# Patient Record
Sex: FEMALE | Race: ASIAN | NOT HISPANIC OR LATINO | URBAN - METROPOLITAN AREA
[De-identification: names, ages, dates, MRNs, and addresses within clinical notes are randomized per-mention and may not be internally consistent; named-entity substitution may affect disease eponyms.]

---

## 2018-11-16 ENCOUNTER — OUTPATIENT (OUTPATIENT)
Dept: OUTPATIENT SERVICES | Facility: HOSPITAL | Age: 38
LOS: 1 days | End: 2018-11-16
Payer: COMMERCIAL

## 2018-11-16 DIAGNOSIS — Z01.818 ENCOUNTER FOR OTHER PREPROCEDURAL EXAMINATION: ICD-10-CM

## 2018-11-16 LAB
BLD GP AB SCN SERPL QL: NEGATIVE — SIGNIFICANT CHANGE UP
HCT VFR BLD CALC: 36.6 % — SIGNIFICANT CHANGE UP (ref 34.5–45)
HGB BLD-MCNC: 12.3 G/DL — SIGNIFICANT CHANGE UP (ref 11.5–15.5)
MCHC RBC-ENTMCNC: 30.6 PG — SIGNIFICANT CHANGE UP (ref 27–34)
MCHC RBC-ENTMCNC: 33.6 G/DL — SIGNIFICANT CHANGE UP (ref 32–36)
MCV RBC AUTO: 91 FL — SIGNIFICANT CHANGE UP (ref 80–100)
PLATELET # BLD AUTO: 152 K/UL — SIGNIFICANT CHANGE UP (ref 150–400)
RBC # BLD: 4.02 M/UL — SIGNIFICANT CHANGE UP (ref 3.8–5.2)
RBC # FLD: 14.2 % — SIGNIFICANT CHANGE UP (ref 10.3–16.9)
RH IG SCN BLD-IMP: POSITIVE — SIGNIFICANT CHANGE UP
T PALLIDUM AB TITR SER: NEGATIVE — SIGNIFICANT CHANGE UP
WBC # BLD: 6.3 K/UL — SIGNIFICANT CHANGE UP (ref 3.8–10.5)
WBC # FLD AUTO: 6.3 K/UL — SIGNIFICANT CHANGE UP (ref 3.8–10.5)

## 2018-11-16 PROCEDURE — 86901 BLOOD TYPING SEROLOGIC RH(D): CPT

## 2018-11-16 PROCEDURE — 86780 TREPONEMA PALLIDUM: CPT

## 2018-11-16 PROCEDURE — 86850 RBC ANTIBODY SCREEN: CPT

## 2018-11-16 PROCEDURE — 86900 BLOOD TYPING SEROLOGIC ABO: CPT

## 2018-11-16 PROCEDURE — 85027 COMPLETE CBC AUTOMATED: CPT

## 2018-11-19 ENCOUNTER — RESULT REVIEW (OUTPATIENT)
Age: 38
End: 2018-11-19

## 2018-11-19 ENCOUNTER — INPATIENT (INPATIENT)
Facility: HOSPITAL | Age: 38
LOS: 1 days | Discharge: HOME | End: 2018-11-21
Attending: OBSTETRICS & GYNECOLOGY | Admitting: OBSTETRICS & GYNECOLOGY
Payer: COMMERCIAL

## 2018-11-19 ENCOUNTER — TRANSCRIPTION ENCOUNTER (OUTPATIENT)
Age: 38
End: 2018-11-19

## 2018-11-19 VITALS — HEIGHT: 62 IN | WEIGHT: 127.21 LBS

## 2018-11-19 LAB
ALBUMIN SERPL ELPH-MCNC: 3.7 G/DL — SIGNIFICANT CHANGE UP (ref 3.3–5)
ALP SERPL-CCNC: 69 U/L — SIGNIFICANT CHANGE UP (ref 40–120)
ALT FLD-CCNC: 8 U/L — LOW (ref 10–45)
ANION GAP SERPL CALC-SCNC: 16 MMOL/L — SIGNIFICANT CHANGE UP (ref 5–17)
AST SERPL-CCNC: 15 U/L — SIGNIFICANT CHANGE UP (ref 10–40)
BASOPHILS NFR BLD AUTO: 0.3 % — SIGNIFICANT CHANGE UP (ref 0–2)
BILIRUB SERPL-MCNC: 0.3 MG/DL — SIGNIFICANT CHANGE UP (ref 0.2–1.2)
BLD GP AB SCN SERPL QL: NEGATIVE — SIGNIFICANT CHANGE UP
BUN SERPL-MCNC: 9 MG/DL — SIGNIFICANT CHANGE UP (ref 7–23)
CALCIUM SERPL-MCNC: 8.8 MG/DL — SIGNIFICANT CHANGE UP (ref 8.4–10.5)
CHLORIDE SERPL-SCNC: 98 MMOL/L — SIGNIFICANT CHANGE UP (ref 96–108)
CO2 SERPL-SCNC: 20 MMOL/L — LOW (ref 22–31)
CREAT SERPL-MCNC: 0.49 MG/DL — LOW (ref 0.5–1.3)
EOSINOPHIL NFR BLD AUTO: 0.8 % — SIGNIFICANT CHANGE UP (ref 0–6)
GLUCOSE SERPL-MCNC: 85 MG/DL — SIGNIFICANT CHANGE UP (ref 70–99)
HCT VFR BLD CALC: 35.2 % — SIGNIFICANT CHANGE UP (ref 34.5–45)
HGB BLD-MCNC: 11.7 G/DL — SIGNIFICANT CHANGE UP (ref 11.5–15.5)
LYMPHOCYTES # BLD AUTO: 26 % — SIGNIFICANT CHANGE UP (ref 13–44)
MCHC RBC-ENTMCNC: 30 PG — SIGNIFICANT CHANGE UP (ref 27–34)
MCHC RBC-ENTMCNC: 33.2 G/DL — SIGNIFICANT CHANGE UP (ref 32–36)
MCV RBC AUTO: 90.3 FL — SIGNIFICANT CHANGE UP (ref 80–100)
MONOCYTES NFR BLD AUTO: 9.2 % — SIGNIFICANT CHANGE UP (ref 2–14)
NEUTROPHILS NFR BLD AUTO: 63.7 % — SIGNIFICANT CHANGE UP (ref 43–77)
NT-PROBNP SERPL-SCNC: 22 PG/ML — SIGNIFICANT CHANGE UP (ref 0–300)
PLATELET # BLD AUTO: 150 K/UL — SIGNIFICANT CHANGE UP (ref 150–400)
POTASSIUM SERPL-MCNC: 3.9 MMOL/L — SIGNIFICANT CHANGE UP (ref 3.5–5.3)
POTASSIUM SERPL-SCNC: 3.9 MMOL/L — SIGNIFICANT CHANGE UP (ref 3.5–5.3)
PROT SERPL-MCNC: 6.9 G/DL — SIGNIFICANT CHANGE UP (ref 6–8.3)
RBC # BLD: 3.9 M/UL — SIGNIFICANT CHANGE UP (ref 3.8–5.2)
RBC # FLD: 14.3 % — SIGNIFICANT CHANGE UP (ref 10.3–16.9)
RH IG SCN BLD-IMP: POSITIVE — SIGNIFICANT CHANGE UP
SODIUM SERPL-SCNC: 134 MMOL/L — LOW (ref 135–145)
T PALLIDUM AB TITR SER: NEGATIVE — SIGNIFICANT CHANGE UP
WBC # BLD: 6.4 K/UL — SIGNIFICANT CHANGE UP (ref 3.8–10.5)
WBC # FLD AUTO: 6.4 K/UL — SIGNIFICANT CHANGE UP (ref 3.8–10.5)

## 2018-11-19 RX ORDER — CITRIC ACID/SODIUM CITRATE 300-500 MG
15 SOLUTION, ORAL ORAL EVERY 4 HOURS
Qty: 0 | Refills: 0 | Status: DISCONTINUED | OUTPATIENT
Start: 2018-11-19 | End: 2018-11-19

## 2018-11-19 RX ORDER — GLYCERIN ADULT
1 SUPPOSITORY, RECTAL RECTAL AT BEDTIME
Qty: 0 | Refills: 0 | Status: DISCONTINUED | OUTPATIENT
Start: 2018-11-19 | End: 2018-11-21

## 2018-11-19 RX ORDER — CEFAZOLIN SODIUM 1 G
2000 VIAL (EA) INJECTION ONCE
Qty: 0 | Refills: 0 | Status: COMPLETED | OUTPATIENT
Start: 2018-11-19 | End: 2018-11-19

## 2018-11-19 RX ORDER — OXYCODONE AND ACETAMINOPHEN 5; 325 MG/1; MG/1
1 TABLET ORAL
Qty: 0 | Refills: 0 | Status: DISCONTINUED | OUTPATIENT
Start: 2018-11-19 | End: 2018-11-21

## 2018-11-19 RX ORDER — OXYTOCIN 10 UNIT/ML
333.33 VIAL (ML) INJECTION
Qty: 20 | Refills: 0 | Status: DISCONTINUED | OUTPATIENT
Start: 2018-11-19 | End: 2018-11-21

## 2018-11-19 RX ORDER — OXYTOCIN 10 UNIT/ML
41.67 VIAL (ML) INJECTION
Qty: 20 | Refills: 0 | Status: DISCONTINUED | OUTPATIENT
Start: 2018-11-19 | End: 2018-11-19

## 2018-11-19 RX ORDER — FERROUS SULFATE 325(65) MG
325 TABLET ORAL DAILY
Qty: 0 | Refills: 0 | Status: DISCONTINUED | OUTPATIENT
Start: 2018-11-19 | End: 2018-11-21

## 2018-11-19 RX ORDER — OXYCODONE AND ACETAMINOPHEN 5; 325 MG/1; MG/1
2 TABLET ORAL EVERY 6 HOURS
Qty: 0 | Refills: 0 | Status: DISCONTINUED | OUTPATIENT
Start: 2018-11-19 | End: 2018-11-21

## 2018-11-19 RX ORDER — LANOLIN
1 OINTMENT (GRAM) TOPICAL
Qty: 0 | Refills: 0 | Status: DISCONTINUED | OUTPATIENT
Start: 2018-11-19 | End: 2018-11-21

## 2018-11-19 RX ORDER — DIPHENHYDRAMINE HCL 50 MG
25 CAPSULE ORAL EVERY 6 HOURS
Qty: 0 | Refills: 0 | Status: DISCONTINUED | OUTPATIENT
Start: 2018-11-19 | End: 2018-11-21

## 2018-11-19 RX ORDER — NALOXONE HYDROCHLORIDE 4 MG/.1ML
0.1 SPRAY NASAL
Qty: 0 | Refills: 0 | Status: DISCONTINUED | OUTPATIENT
Start: 2018-11-19 | End: 2018-11-21

## 2018-11-19 RX ORDER — OXYTOCIN 10 UNIT/ML
41.67 VIAL (ML) INJECTION
Qty: 20 | Refills: 0 | Status: DISCONTINUED | OUTPATIENT
Start: 2018-11-19 | End: 2018-11-21

## 2018-11-19 RX ORDER — TETANUS TOXOID, REDUCED DIPHTHERIA TOXOID AND ACELLULAR PERTUSSIS VACCINE, ADSORBED 5; 2.5; 8; 8; 2.5 [IU]/.5ML; [IU]/.5ML; UG/.5ML; UG/.5ML; UG/.5ML
0.5 SUSPENSION INTRAMUSCULAR ONCE
Qty: 0 | Refills: 0 | Status: DISCONTINUED | OUTPATIENT
Start: 2018-11-19 | End: 2018-11-21

## 2018-11-19 RX ORDER — SODIUM CHLORIDE 9 MG/ML
1000 INJECTION, SOLUTION INTRAVENOUS
Qty: 0 | Refills: 0 | Status: DISCONTINUED | OUTPATIENT
Start: 2018-11-19 | End: 2018-11-19

## 2018-11-19 RX ORDER — IBUPROFEN 200 MG
600 TABLET ORAL EVERY 6 HOURS
Qty: 0 | Refills: 0 | Status: DISCONTINUED | OUTPATIENT
Start: 2018-11-19 | End: 2018-11-21

## 2018-11-19 RX ORDER — CITRIC ACID/SODIUM CITRATE 300-500 MG
30 SOLUTION, ORAL ORAL ONCE
Qty: 0 | Refills: 0 | Status: COMPLETED | OUTPATIENT
Start: 2018-11-19 | End: 2018-11-19

## 2018-11-19 RX ORDER — SODIUM CHLORIDE 9 MG/ML
1000 INJECTION, SOLUTION INTRAVENOUS ONCE
Qty: 0 | Refills: 0 | Status: COMPLETED | OUTPATIENT
Start: 2018-11-19 | End: 2018-11-19

## 2018-11-19 RX ORDER — SODIUM CHLORIDE 9 MG/ML
1000 INJECTION, SOLUTION INTRAVENOUS
Qty: 0 | Refills: 0 | Status: DISCONTINUED | OUTPATIENT
Start: 2018-11-19 | End: 2018-11-21

## 2018-11-19 RX ORDER — OXYTOCIN 10 UNIT/ML
333.33 VIAL (ML) INJECTION
Qty: 20 | Refills: 0 | Status: COMPLETED | OUTPATIENT
Start: 2018-11-19 | End: 2018-11-19

## 2018-11-19 RX ORDER — IBUPROFEN 200 MG
600 TABLET ORAL ONCE
Qty: 0 | Refills: 0 | Status: COMPLETED | OUTPATIENT
Start: 2018-11-19 | End: 2018-11-19

## 2018-11-19 RX ORDER — DOCUSATE SODIUM 100 MG
100 CAPSULE ORAL
Qty: 0 | Refills: 0 | Status: DISCONTINUED | OUTPATIENT
Start: 2018-11-19 | End: 2018-11-21

## 2018-11-19 RX ORDER — SIMETHICONE 80 MG/1
80 TABLET, CHEWABLE ORAL EVERY 4 HOURS
Qty: 0 | Refills: 0 | Status: DISCONTINUED | OUTPATIENT
Start: 2018-11-19 | End: 2018-11-21

## 2018-11-19 RX ORDER — ONDANSETRON 8 MG/1
4 TABLET, FILM COATED ORAL EVERY 6 HOURS
Qty: 0 | Refills: 0 | Status: DISCONTINUED | OUTPATIENT
Start: 2018-11-19 | End: 2018-11-21

## 2018-11-19 RX ADMIN — SODIUM CHLORIDE 2000 MILLILITER(S): 9 INJECTION, SOLUTION INTRAVENOUS at 06:30

## 2018-11-19 RX ADMIN — Medication 100 MILLIGRAM(S): at 08:09

## 2018-11-19 RX ADMIN — Medication 125 MILLIUNIT(S)/MIN: at 11:17

## 2018-11-19 RX ADMIN — Medication 600 MILLIGRAM(S): at 12:32

## 2018-11-19 RX ADMIN — Medication 125 MILLIUNIT(S)/MIN: at 12:13

## 2018-11-19 RX ADMIN — Medication 600 MILLIGRAM(S): at 17:29

## 2018-11-19 RX ADMIN — SODIUM CHLORIDE 125 MILLILITER(S): 9 INJECTION, SOLUTION INTRAVENOUS at 17:28

## 2018-11-19 RX ADMIN — Medication 30 MILLILITER(S): at 08:19

## 2018-11-19 RX ADMIN — Medication 600 MILLIGRAM(S): at 23:31

## 2018-11-19 RX ADMIN — SODIUM CHLORIDE 125 MILLILITER(S): 9 INJECTION, SOLUTION INTRAVENOUS at 07:06

## 2018-11-19 RX ADMIN — Medication 600 MILLIGRAM(S): at 11:45

## 2018-11-19 RX ADMIN — Medication 1000 MILLIUNIT(S)/MIN: at 11:16

## 2018-11-19 NOTE — DISCHARGE NOTE OB - PATIENT PORTAL LINK FT
You can access the [facility] Patient Portal, offered by [facility], by registering with the following website: [email or web address]

## 2018-11-19 NOTE — PROGRESS NOTE ADULT - SUBJECTIVE AND OBJECTIVE BOX
Section Operative Note      Pre-Op Diagnosis:   Reepeat  cceasarean section at  [facility]      Post-Op Diagnosis:  SAME ,  MILD PELVIC  ADHESIONS      Time Out: 	[x ] Performed		[ ]Not Performed:  Procedure: 	 Section: 	[X ] Repeat 	#____2___	[ ] Primary         [ ]Emergency	        [ ]Other____________:  Uterine incision:         [ x]Low Transverse C/S	[ ]Low Vertical C/S           [ ]Classical C/S							  Additional Procedures: 	[ ] Bilateral Tubal Ligation.  Type:______________  Other:	[X ]Lysis of Adhesions   	[ ]C-Hysterectomy          [ ]Other__________________:  Surgeon:  Dr. Carroll                                                   .	Assist:   ___DR MARTINEZ ________________.                                                                             .   Anesthesia: ____DR APTEL__________________________	Type: [ ]Epidural  [ X] Spinal   [ ] General	[ ]Other:  IV Fluids:IVRL 1400CC,  20  U PITOCIN  2 GT  ANCEF 		Urine Output:	400CC	Costa:  [X ] Yes [ ]No [ ] Other:  EBL: 900CC   		  Delivery findings:    [ X] Live 	[ ]Non-Viable: 	[ X]MALE   [ ]FEMALE, Infant. APGAR     9         AND          9       .  [X ] Peds at delivery.  [ ]MULTIPLE GESTATION -NOTES:      POSITION:    	LOT                    PRESENTATION    VTX                            .  DELIVERED BY:  	[X ]HEAD 		[ ]BREECH 	[ ]OTHER:  		[ X]MANUAL 		[ ]VACUUM	[ ] OTHER:	  PLACENTA: 	[x ]Removed	[ x]Uterus explored		[x ]Empty		[ ]Other 		  		UTERUS:  	[ x]Normal		[ ]Fibroids		[ ] Other:  ADNEXA: 	[ x]Right Normal	[ ]Other:  	[x ]Left Normal	[ ]Other :  PELVIS:	[ x]Normal		[ ]Adhesions [ ]Mild  [ ]Moderate [ ]Severe  Procedure:  	Patient in supine position.    Skin Incision	[x ]Pfannenstiel	[ ]Other:			[X ]Old scar  removal.  		Fascial Incision	[ x]Transverse 	[ ]Other:		  Peritoneal incision	[x ]Performed	[ x]Vertical  [ ]Other:		[ X]Lysis of Adhesions  		Bladder Flap	[ ]Created	[ ]Not Created  		Uterine Incision	[x ]Low transverse	[ ]Low Vertical	[ ]Classical  [ ]Other:   	Uterine Repair	[ x]_#_1_______Layers-Using__1 chromic______________ sutures 	[x ] hemostasis  excellent.  				[ ]Other:                       .                      Abdominal Cavity	[x ]Cleared of clots and debris  Rectus  Muscles  	Reapproximated [x ]Yes Using__1 chromic______________ sutures. [ ]Not Re- Approximated.  Fascial Reapproximation 	[ x]UsingUsing___1 Vicril_____________ sutures [ ]Other:                                 .	  Subcutaneous Tissue 	[x ]Irrigated  and hemostasis assured:[x ]Reapproximated Using____2-0____________ sutures.  Skin Reapproximation:	[x ]Subcuticular Using________________ sutures [x ] Insorb Staples   [ ]Skin Staples [ ]Other:  Dressing applied  and Patient to RR in  [x ] Stable [ ] Other ;                         condition.	  End of Operation;	[x ] Sponge/lap/needle count correct.  [ ] Not correct.  [ ]Not Done [ ]X-ray=Clear  Pathology:	[ x]Placenta.   	[ ]Fallopian Tube Portions [ ]Right [ ]Left.	[ ]Other:                                           	Complications/Attending’s Notes:	[ X] None.  	[ ] Other:                                                                        [ ]CONTINUATION OF NOTES NEXT PAGE:

## 2018-11-19 NOTE — LACTATION INITIAL EVALUATION - NS LACT CON REASON FOR REQ
multiparous mom/39 wk gestation baby, seen around 8 hrs of life. This is the mothers second child. She  her now 5 y.o for 15 months, excluisvely for the first 6, and plans on doing the same with this baby. Colostrum copious and easily expressible, and manual expression was taught with proper return demo. Baby currently sleeping skin to skin on mothers chest. She reports he fed twice thus far since birth, and confirms the latch to have felt strong but not uncomfortable. She exhibits confidence in the breastfeeeding process and declines hands-on assistance at this time. Importance of watching for and responding to early feeding vues was explained, and breastfeeding basics and guidelines reviewed. Encouraged her to call for assistance as needed. Continued SSC and rooming-in also encouraged.

## 2018-11-19 NOTE — DISCHARGE NOTE OB - REDNESS, SWELLING, YELLOW-GREEN OR BLOODY DISCHARGE FROM YOUR INCISION
Hooked Activation    Thank you for requesting access to Hooked. Please follow the instructions below to securely access and download your online medical record. Hooked allows you to send messages to your doctor, view your test results, renew your prescriptions, schedule appointments, and more. How Do I Sign Up? 1. In your internet browser, go to [email or web address]  2. Click on the First Time User? Click Here link in the Sign In box. You will be redirect to the New Member Sign Up page. 3. Enter your Hooked Access Code exactly as it appears below. You will not need to use this code after youve completed the sign-up process. If you do not sign up before the expiration date, you must request a new code. Hooked Access Code: [email or web address]  Expires: 10/23/2017  3:09 PM (This is the date your Hooked access code will )    4. Enter the last four digits of your Social Security Number (xxxx) and Date of Birth (mm/dd/yyyy) as indicated and click Submit. You will be taken to the next sign-up page. 5. Create a Hooked ID. This will be your Hooked login ID and cannot be changed, so think of one that is secure and easy to remember. 6. Create a Hooked password. You can change your password at any time. 7. Enter your Password Reset Question and Answer. This can be used at a later time if you forget your password. 8. Enter your e-mail address. You will receive e-mail notification when new information is available in [address]. 9. Click Sign Up. You can now view and download portions of your medical record. 10. Click the Download Summary menu link to download a portable copy of your medical information. Additional Information    If you have questions, please visit the Frequently Asked Questions section of the Hooked website at [email or web address] Remember, Hooked is NOT to be used for urgent needs. For medical emergencies, dial 911.
Statement Selected

## 2018-11-19 NOTE — DISCHARGE NOTE OB - CARE PLAN
Principal Discharge DX:	Normal postpartum course  Goal:	HOME  Assessment and plan of treatment:	Nothing per  vagina  no heavy  lifting for  six  weeks  no sex  no tub b ath  no swimming

## 2018-11-19 NOTE — DISCHARGE NOTE OB - CARE PROVIDER_API CALL
Walt Carroll), Obstetrics and Gynecology  [address] [place] [id number]  Phone: [phone]  Fax: [phone]

## 2018-11-20 LAB
HCT VFR BLD CALC: 24.5 % — LOW (ref 34.5–45)
HGB BLD-MCNC: 8.2 G/DL — LOW (ref 11.5–15.5)
MCHC RBC-ENTMCNC: 30.1 PG — SIGNIFICANT CHANGE UP (ref 27–34)
MCHC RBC-ENTMCNC: 33.5 G/DL — SIGNIFICANT CHANGE UP (ref 32–36)
MCV RBC AUTO: 90.1 FL — SIGNIFICANT CHANGE UP (ref 80–100)
NT-PROBNP SERPL-SCNC: 48 PG/ML — SIGNIFICANT CHANGE UP (ref 0–300)
PLATELET # BLD AUTO: 124 K/UL — LOW (ref 150–400)
RBC # BLD: 2.72 M/UL — LOW (ref 3.8–5.2)
RBC # FLD: 14.2 % — SIGNIFICANT CHANGE UP (ref 10.3–16.9)
WBC # BLD: 12.5 K/UL — HIGH (ref 3.8–10.5)
WBC # FLD AUTO: 12.5 K/UL — HIGH (ref 3.8–10.5)

## 2018-11-20 RX ADMIN — Medication 1 TABLET(S): at 11:23

## 2018-11-20 RX ADMIN — Medication 600 MILLIGRAM(S): at 18:58

## 2018-11-20 RX ADMIN — Medication 600 MILLIGRAM(S): at 06:03

## 2018-11-20 RX ADMIN — Medication 325 MILLIGRAM(S): at 11:23

## 2018-11-20 RX ADMIN — Medication 600 MILLIGRAM(S): at 23:46

## 2018-11-20 RX ADMIN — Medication 600 MILLIGRAM(S): at 07:00

## 2018-11-20 RX ADMIN — Medication 600 MILLIGRAM(S): at 12:20

## 2018-11-20 RX ADMIN — Medication 600 MILLIGRAM(S): at 00:30

## 2018-11-20 RX ADMIN — OXYCODONE AND ACETAMINOPHEN 1 TABLET(S): 5; 325 TABLET ORAL at 16:52

## 2018-11-20 RX ADMIN — Medication 100 MILLIGRAM(S): at 11:24

## 2018-11-20 RX ADMIN — OXYCODONE AND ACETAMINOPHEN 1 TABLET(S): 5; 325 TABLET ORAL at 20:46

## 2018-11-20 RX ADMIN — Medication 600 MILLIGRAM(S): at 17:58

## 2018-11-20 RX ADMIN — OXYCODONE AND ACETAMINOPHEN 1 TABLET(S): 5; 325 TABLET ORAL at 21:45

## 2018-11-20 RX ADMIN — Medication 600 MILLIGRAM(S): at 11:23

## 2018-11-20 RX ADMIN — OXYCODONE AND ACETAMINOPHEN 1 TABLET(S): 5; 325 TABLET ORAL at 17:50

## 2018-11-20 NOTE — PROGRESS NOTE ADULT - SUBJECTIVE AND OBJECTIVE BOX
Patient evaluated at bedside.   She reports pain is well controlled.  She denies headache, dizziness, chest pain, palpitations, shortness of breathe, nausea, vomiting or heavy vaginal bleeding.  She has been ambulating without assistance, voiding spontaneously, passing gas, tolerating regular diet and is breastfeeding.    Physical Exam:  Vital Signs Last 24 Hrs  T(C): 36.8 (20 Nov 2018 06:10), Max: 37.2 (19 Nov 2018 22:00)  T(F): 98.2 (20 Nov 2018 06:10), Max: 98.9 (19 Nov 2018 22:00)  HR: 87 (20 Nov 2018 06:10) (64 - 87)  BP: 98/64 (20 Nov 2018 06:10) (92/58 - 107/81)  BP(mean): --  RR: 18 (20 Nov 2018 06:10) (14 - 20)  SpO2: 99% (20 Nov 2018 06:10) (97% - 99%)    11-19 @ 07:01  -  11-20 @ 07:00  --------------------------------------------------------  IN: 4650 mL / OUT: 3545 mL / NET: 1105 mL        GA: NAD, A+0 x 3  CV: RRR  Pulm: Normal work of breathing  Breasts: soft, nontender, no palpable masses  Abd: + BS, soft, nontender, nondistended, no rebound or guarding, uterus firm at midline,  fundus below umbilicus  Incision: well approximated, no erythema or discharge  : lochia WNL  Extremities: no swelling or calf tenderness                            8.2    12.5  )-----------( 124      ( 20 Nov 2018 06:23 )             24.5     11-19    134<L>  |  98  |  9   ----------------------------<  85  3.9   |  20<L>  |  0.49<L>    Ca    8.8      19 Nov 2018 06:52    TPro  6.9  /  Alb  3.7  /  TBili  0.3  /  DBili  x   /  AST  15  /  ALT  8<L>  /  AlkPhos  69  11-19

## 2018-11-20 NOTE — PROGRESS NOTE ADULT - SUBJECTIVE AND OBJECTIVE BOX
OB/GYN ATTENDING POSTOP ROUNDS                                    Subjective:  38yFemale  "  I want to go home tomorrow."  Complaints: [x ]None	[ ]Other:      Objective:  		Vital Signs:  T(C): 36.8 (18 @ 06:10), Max: 37.2 (18 @ 22:00)  HR: 87 (18 @ 06:10) (64 - 87)  BP: 98/64 (18 @ 06:10) (92/58 - 107/81)  RR: 18 (18 @ 06:10) (14 - 20)  SpO2: 99% (18 @ 06:10) (97% - 99%)  Wt(kg): --     @ 07:01  -   @ 07:00  --------------------------------------------------------  IN: 4650 mL / OUT: 3545 mL / NET: 1105 mL        		General:      NAD 	[x ] Yes 	[ ]No,	 A&O X3	[ x] Yes  [ ] No			  		Abdomen:   Palpation  	[x ]Normal	[ ]Other:	  	   Incision:              [x  ]Intact	   [ x] Clean	[x ] Dry    [   ]other:  BS		[ x]Normal 	[ ]Other:  			  LE:  	Calf tenderness    	[ x]None		[x ]No  Sign of DVT	[ ]Other:  		Lochia:	 		[ x]Normal,	[ ]Other:  		Labs:	                      8.2    12.5  )-----------( 124      ( 2018 06:23 )             24.5   	    134<L>  |  98  |  9   ----------------------------<  85  3.9   |  20<L>  |  0.49<L>    Ca    8.8      2018 06:52    TPro  6.9  /  Alb  3.7  /  TBili  0.3  /  DBili  x   /  AST  15  /  ALT  8<L>  /  AlkPhos  69       Assessment:  			[x ] Post-op  Section  			Day #___1_____  				[ ] Other:     	Plan:	1.  Supportive Care		[ ]Other:                                           2. Pain:		[ x] Well Controlled 	[ ] Other:	           	     	3. Misc.		[ x]Continue current care                                            4. Discharge home on POD 3/4 if stable	[ ] Other:  		  Notes:			[x ] None			[ ] Other:      													Walt Carroll MD [phone]

## 2018-11-20 NOTE — PROGRESS NOTE ADULT - ASSESSMENT
38y Female POD#1    s/p C/S, Uncomplicated                                       1. Neuro/Pain:  OPM  2  CV:  VS per routine  3. Pulm: Encourage ISS & Ambulation  4. GI:  Advance as laura  5. : voiding spontaneously  6. DVT ppx: SCDs, SQH 5000 mg BID  7. Dispo: POD #3 or #4

## 2018-11-21 VITALS
RESPIRATION RATE: 18 BRPM | HEART RATE: 97 BPM | TEMPERATURE: 100 F | OXYGEN SATURATION: 96 % | DIASTOLIC BLOOD PRESSURE: 77 MMHG | SYSTOLIC BLOOD PRESSURE: 114 MMHG

## 2018-11-21 PROCEDURE — 80053 COMPREHEN METABOLIC PANEL: CPT

## 2018-11-21 PROCEDURE — 36415 COLL VENOUS BLD VENIPUNCTURE: CPT

## 2018-11-21 PROCEDURE — 83880 ASSAY OF NATRIURETIC PEPTIDE: CPT

## 2018-11-21 PROCEDURE — 86901 BLOOD TYPING SEROLOGIC RH(D): CPT

## 2018-11-21 PROCEDURE — 88307 TISSUE EXAM BY PATHOLOGIST: CPT

## 2018-11-21 PROCEDURE — 86780 TREPONEMA PALLIDUM: CPT

## 2018-11-21 PROCEDURE — 86900 BLOOD TYPING SEROLOGIC ABO: CPT

## 2018-11-21 PROCEDURE — 86850 RBC ANTIBODY SCREEN: CPT

## 2018-11-21 PROCEDURE — 85027 COMPLETE CBC AUTOMATED: CPT

## 2018-11-21 PROCEDURE — 85025 COMPLETE CBC W/AUTO DIFF WBC: CPT

## 2018-11-21 RX ORDER — VALACYCLOVIR 500 MG/1
1 TABLET, FILM COATED ORAL
Qty: 0 | Refills: 0 | COMMUNITY

## 2018-11-21 RX ADMIN — Medication 100 MILLIGRAM(S): at 06:05

## 2018-11-21 RX ADMIN — Medication 600 MILLIGRAM(S): at 05:41

## 2018-11-21 RX ADMIN — Medication 1 APPLICATION(S): at 06:05

## 2018-11-21 RX ADMIN — Medication 600 MILLIGRAM(S): at 06:13

## 2018-11-21 RX ADMIN — Medication 600 MILLIGRAM(S): at 00:34

## 2018-11-26 DIAGNOSIS — Z34.83 ENCOUNTER FOR SUPERVISION OF OTHER NORMAL PREGNANCY, THIRD TRIMESTER: ICD-10-CM

## 2018-11-26 DIAGNOSIS — O34.211 MATERNAL CARE FOR LOW TRANSVERSE SCAR FROM PREVIOUS CESAREAN DELIVERY: ICD-10-CM

## 2018-11-26 DIAGNOSIS — Z3A.39 39 WEEKS GESTATION OF PREGNANCY: ICD-10-CM

## 2018-11-26 DIAGNOSIS — A60.00 HERPESVIRAL INFECTION OF UROGENITAL SYSTEM, UNSPECIFIED: ICD-10-CM

## 2018-11-26 LAB — SURGICAL PATHOLOGY STUDY: SIGNIFICANT CHANGE UP

## 2019-12-07 NOTE — PATIENT PROFILE OB - AMNIOTIC FLUID ODOR, LABOR
ACTIVITY: Rest and take it easy for the first 24 hours.  A responsible adult is recommended to remain with you during that time.  It is normal to feel sleepy.  We encourage you to not do anything that requires balance, judgment or coordination.    MILD FLU-LIKE SYMPTOMS ARE NORMAL. YOU MAY EXPERIENCE GENERALIZED MUSCLE ACHES, THROAT IRRITATION, HEADACHE AND/OR SOME NAUSEA.    FOR 24 HOURS DO NOT:  Drive, operate machinery or run household appliances.  Drink beer or alcoholic beverages.   Make important decisions or sign legal documents.    SPECIAL INSTRUCTIONS: Erythromycin ointment to eye lids and right side of nose twice daily.  Ice pack to eyes 20 minutes on and 20 minutes off.    DIET: To avoid nausea, slowly advance diet as tolerated, avoiding spicy or greasy foods for the first day.  Add more substantial food to your diet according to your physician's instructions.  Babies can be fed formula or breast milk as soon as they are hungry.  INCREASE FLUIDS AND FIBER TO AVOID CONSTIPATION.    SURGICAL DRESSING/BATHING: avoid getting soap in eyes. Don't rub eyes. Pat dry if needed.    FOLLOW-UP APPOINTMENT:  A follow-up appointment should be arranged with your doctor in one week; call to schedule.    You should CALL YOUR PHYSICIAN if you develop:  Fever greater than 101 degrees F.  Pain not relieved by medication, or persistent nausea or vomiting.  Excessive bleeding (blood soaking through dressing) or unexpected drainage from the wound.  Extreme redness or swelling around the incision site, drainage of pus or foul smelling drainage.  Inability to urinate or empty your bladder within 8 hours.  Problems with breathing or chest pain.    You should call 911 if you develop problems with breathing or chest pain.  If you are unable to contact your doctor or surgical center, you should go to the nearest emergency room or urgent care center.  Physician's telephone #: [phone]    If any questions arise, call your  doctor.  If your doctor is not available, please feel free to call the Surgical Center at [phone].  The Center is open Monday through Friday from 7AM to 7PM.  You can also call the HEALTH HOTLINE open 24 hours/day, 7 days/week and speak to a nurse at [phone], or toll free at [phone].    A registered nurse may call you a few days after your surgery to see how you are doing after your procedure.    MEDICATIONS: Resume taking daily medication.  Take prescribed pain medication with food.  If no medication is prescribed, you may take non-aspirin pain medication if needed.  PAIN MEDICATION CAN BE VERY CONSTIPATING.  Take a stool softener or laxative such as senokot, pericolace, or milk of magnesia if needed.    Prescription given for none.  Last pain medication given at none given.    If your physician has prescribed pain medication that includes Acetaminophen (Tylenol), do not take additional Acetaminophen (Tylenol) while taking the prescribed medication.    Depression / Suicide Risk    As you are discharged from this [facility] facility, it is important to learn how to keep safe from harming yourself.    Recognize the warning signs:  · Abrupt changes in personality, positive or negative- including increase in energy   · Giving away possessions  · Change in eating patterns- significant weight changes-  positive or negative  · Change in sleeping patterns- unable to sleep or sleeping all the time   · Unwillingness or inability to communicate  · Depression  · Unusual sadness, discouragement and loneliness  · Talk of wanting to die  · Neglect of personal appearance   · Rebelliousness- reckless behavior  · Withdrawal from people/activities they love  · Confusion- inability to concentrate     If you or a loved one observes any of these behaviors or has concerns about self-harm, here's what you can do:  · Talk about it- your feelings and reasons for harming yourself  · Remove any means that you might use  to hurt yourself (examples: pills, rope, extension cords, firearm)  · Get professional help from the community (Mental Health, Substance Abuse, psychological counseling)  · Do not be alone:Call your Safe Contact- someone whom you trust who will be there for you.  · Call your local CRISIS HOTLINE 626-8501 or 448-701-5569  · Call your local Children's Mobile Crisis Response Team [place] (829) 996-4028 or www.HooftyMatch  · Call the toll free National Suicide Prevention Hotlines   · National Suicide Prevention Lifeline 302-825-UPWG (8215)  · National Hope Line Network 800-SUICIDE (536-7729)   normal

## 2020-02-25 NOTE — DISCHARGE NOTE OB - CALL YOUR HEALTHCARE PROVIDER IF YOU ARE EXPERIENCING SYMPTOMS OF DEPRESSION THAT LAST MORE THAN THREE DAYS
Pt to ED c/o n/v/d for 2 days. Unable to keep fluids down. Reports 15 episodes of diarrhea yesterday. Reports mild abd cramping. Low grade fever.   
Statement Selected

## 2023-02-22 NOTE — PATIENT PROFILE OB - FUNCTIONAL SCREEN CURRENT LEVEL: COMMUNICATION, MLM
You were seen today with Dr. Nolan Rodas. Your primary contact after today's visit is: JAMIL Casiano    Next steps:  Please reach out to let us know if you have any questions after your visits today, and let us know how you would like to proceed.    How to Contact Us  Send a XenoOnehart message to your provider.   Call the clinic - your call will be routed appropriately.   Neurosurgery Clinic: [phone]  ENT Clinic: [phone]   To speak directly to an RN Care Coordinator:  Stephenie RN: [phone]  Valencia RN: [phone]    Note: We do our best to check voicemail frequently throughout the day and make every effort to return calls within 1-2 business days. For urgent matters, please use the general clinic phone numbers listed above.    0 = understands/communicates without difficulty

## 2024-06-26 ENCOUNTER — NON-APPOINTMENT (OUTPATIENT)
Age: 44
End: 2024-06-26